# Patient Record
Sex: FEMALE | HISPANIC OR LATINO | URBAN - METROPOLITAN AREA
[De-identification: names, ages, dates, MRNs, and addresses within clinical notes are randomized per-mention and may not be internally consistent; named-entity substitution may affect disease eponyms.]

---

## 2019-01-25 ENCOUNTER — OFFICE VISIT (OUTPATIENT)
Dept: URBAN - METROPOLITAN AREA CLINIC 48 | Facility: CLINIC | Age: 79
End: 2019-01-25
Payer: MEDICARE

## 2019-01-25 DIAGNOSIS — H40.033 ANATOMICAL NARROW ANGLE, BILATERAL: Primary | ICD-10-CM

## 2019-01-25 PROCEDURE — 99204 OFFICE O/P NEW MOD 45 MIN: CPT | Performed by: OPHTHALMOLOGY

## 2019-01-25 PROCEDURE — 92020 GONIOSCOPY: CPT | Performed by: OPHTHALMOLOGY

## 2019-01-25 RX ORDER — PREDNISOLONE ACETATE 10 MG/ML
1 % SUSPENSION/ DROPS OPHTHALMIC
Qty: 1 | Refills: 1 | Status: ACTIVE
Start: 2019-01-25

## 2019-01-25 ASSESSMENT — INTRAOCULAR PRESSURE
OD: 23
OS: 23

## 2019-01-25 NOTE — IMPRESSION/PLAN
Impression: Anatomical narrow angle, bilateral: H40.033. Patient vision is CF OU but patient has dementia. Plan: Narrow angles, intraocular pressure stable but patient at increased risk of narrow angle glaucoma. Recommend LPI. r/b/a discussed. questions answered. patient wishes to proceed with surgery. Patient's family will make modifications to wheel chair to make it safe for the patient on laser day. Start Prednisolone qid OU starting day after laser Schedule LPI both eyes same day, rl2 PO1 Thursday please have Dr. Aleksandar Singleton check if PI is patent if so then safe to Dilate Next laser DAY ( Tuesday)